# Patient Record
Sex: FEMALE | URBAN - METROPOLITAN AREA
[De-identification: names, ages, dates, MRNs, and addresses within clinical notes are randomized per-mention and may not be internally consistent; named-entity substitution may affect disease eponyms.]

---

## 2023-07-24 ENCOUNTER — LAB OUTSIDE AN ENCOUNTER (OUTPATIENT)
Dept: URBAN - METROPOLITAN AREA CLINIC 72 | Facility: CLINIC | Age: 23
End: 2023-07-24

## 2023-07-24 ENCOUNTER — OFFICE VISIT (OUTPATIENT)
Dept: URBAN - METROPOLITAN AREA CLINIC 72 | Facility: CLINIC | Age: 23
End: 2023-07-24
Payer: COMMERCIAL

## 2023-07-24 VITALS
HEIGHT: 68 IN | HEART RATE: 118 BPM | BODY MASS INDEX: 30.43 KG/M2 | DIASTOLIC BLOOD PRESSURE: 84 MMHG | TEMPERATURE: 97.5 F | SYSTOLIC BLOOD PRESSURE: 135 MMHG | WEIGHT: 200.8 LBS

## 2023-07-24 DIAGNOSIS — R10.30 LOWER ABDOMINAL PAIN: ICD-10-CM

## 2023-07-24 DIAGNOSIS — K21.00 GASTROESOPHAGEAL REFLUX DISEASE WITH ESOPHAGITIS WITHOUT HEMORRHAGE: ICD-10-CM

## 2023-07-24 DIAGNOSIS — R19.8 ALTERED BOWEL FUNCTION: ICD-10-CM

## 2023-07-24 PROBLEM — 266433003: Status: ACTIVE | Noted: 2023-07-24

## 2023-07-24 PROCEDURE — 99204 OFFICE O/P NEW MOD 45 MIN: CPT | Performed by: INTERNAL MEDICINE

## 2023-07-24 RX ORDER — DICYCLOMINE HYDROCHLORIDE 10 MG/1
1 CAPSULE CAPSULE ORAL THREE TIMES A DAY
Qty: 90 CAPSULE | Refills: 1 | OUTPATIENT
Start: 2023-07-24 | End: 2023-09-22

## 2023-07-24 RX ORDER — ATOGEPANT 60 MG/1
TAKE ONE TABLET BY MOUTH ONE TIME DAILY TABLET ORAL
Qty: 30 UNSPECIFIED | Refills: 2 | Status: ACTIVE | COMMUNITY

## 2023-07-24 RX ORDER — DULOXETINE HYDROCHLORIDE 60 MG/1
CAPSULE, DELAYED RELEASE ORAL
Qty: 90 CAPSULE | Status: ACTIVE | COMMUNITY

## 2023-07-24 RX ORDER — ELAGOLIX 150 MG/1
TABLET, FILM COATED ORAL
Qty: 84 TABLET | Status: ACTIVE | COMMUNITY

## 2023-07-24 RX ORDER — OMEPRAZOLE 40 MG/1
1 CAPSULE 30 MINUTES BEFORE MORNING MEAL CAPSULE, DELAYED RELEASE ORAL ONCE A DAY
Qty: 30 | Refills: 3 | OUTPATIENT
Start: 2023-07-24

## 2023-07-24 NOTE — HPI-TODAY'S VISIT:
22-year-old female new to the clinic referred by Dr. Barboza for diarrhea.   A copy of this note will be sent to the referring provider.  Past medical history of endometriosis had [place] IUD placed 11/2022.  Laparoscopy was discussed.  Referred for pelvic floor dysfunction to physical therapy.  On interview today she reports intermittent diarrhea and constipation for years.  Occur equally. No melena or hematochezia.  Has lower abdominal pain 4 days a week.  Unchanged with BM or food.  On OTC prilose daily for GERD for 6 months with breakthrough reflux.  Takes NSAIDs on occasional for her abdominal pain.    No CP, SOB, palpitations, syncope, near-syncope or problems with anesthesia previously.  LMP:  IUD  No prior EGD or colonoscopy  Had labs within the past year with PCP but she doesn't think her thyroid levels have been checked in 3 years .

## 2023-10-06 ENCOUNTER — LAB OUTSIDE AN ENCOUNTER (OUTPATIENT)
Dept: URBAN - METROPOLITAN AREA CLINIC 72 | Facility: CLINIC | Age: 23
End: 2023-10-06

## 2023-10-07 LAB
T4, FREE: 0.9
TSH: 1.14

## 2023-10-13 ENCOUNTER — TELEPHONE ENCOUNTER (OUTPATIENT)
Dept: URBAN - METROPOLITAN AREA CLINIC 72 | Facility: CLINIC | Age: 23
End: 2023-10-13

## 2023-10-16 ENCOUNTER — OFFICE VISIT (OUTPATIENT)
Dept: URBAN - METROPOLITAN AREA MEDICAL CENTER 40 | Facility: MEDICAL CENTER | Age: 23
End: 2023-10-16

## 2023-11-02 ENCOUNTER — OFFICE VISIT (OUTPATIENT)
Dept: URBAN - METROPOLITAN AREA MEDICAL CENTER 40 | Facility: MEDICAL CENTER | Age: 23
End: 2023-11-02

## 2023-11-08 ENCOUNTER — DASHBOARD ENCOUNTERS (OUTPATIENT)
Age: 23
End: 2023-11-08

## 2023-11-08 ENCOUNTER — OFFICE VISIT (OUTPATIENT)
Dept: URBAN - METROPOLITAN AREA CLINIC 72 | Facility: CLINIC | Age: 23
End: 2023-11-08
Payer: COMMERCIAL

## 2023-11-08 VITALS
BODY MASS INDEX: 30.92 KG/M2 | TEMPERATURE: 97.3 F | HEART RATE: 102 BPM | HEIGHT: 68 IN | DIASTOLIC BLOOD PRESSURE: 83 MMHG | SYSTOLIC BLOOD PRESSURE: 125 MMHG | WEIGHT: 204 LBS

## 2023-11-08 DIAGNOSIS — R10.30 LOWER ABDOMINAL PAIN: ICD-10-CM

## 2023-11-08 DIAGNOSIS — R19.8 ALTERED BOWEL FUNCTION: ICD-10-CM

## 2023-11-08 DIAGNOSIS — K21.00 GASTROESOPHAGEAL REFLUX DISEASE WITH ESOPHAGITIS WITHOUT HEMORRHAGE: ICD-10-CM

## 2023-11-08 PROCEDURE — 99214 OFFICE O/P EST MOD 30 MIN: CPT | Performed by: INTERNAL MEDICINE

## 2023-11-08 RX ORDER — DULOXETINE HYDROCHLORIDE 60 MG/1
CAPSULE, DELAYED RELEASE ORAL
Qty: 90 CAPSULE | Status: ACTIVE | COMMUNITY

## 2023-11-08 RX ORDER — ATOGEPANT 60 MG/1
TAKE ONE TABLET BY MOUTH ONE TIME DAILY TABLET ORAL
Qty: 30 UNSPECIFIED | Refills: 2 | Status: ACTIVE | COMMUNITY

## 2023-11-08 RX ORDER — ELAGOLIX 150 MG/1
TABLET, FILM COATED ORAL
Qty: 84 TABLET | Status: ACTIVE | COMMUNITY

## 2023-11-08 RX ORDER — OMEPRAZOLE 40 MG/1
1 CAPSULE 30 MINUTES BEFORE MORNING MEAL CAPSULE, DELAYED RELEASE ORAL ONCE A DAY
Qty: 30 | Refills: 3 | Status: ACTIVE | COMMUNITY
Start: 2023-07-24

## 2023-11-08 NOTE — HPI-TODAY'S VISIT:
Jessica returns for follow-up.  Recall she is a 23-year-old female last seen in office on 7/24/2023.  She was referred to us for evaluation for diarrhea.  Reported intermittent diarrhea and constipation for many years no blood in stool.  There was occasional lower abdominal discomfort.  She would take NSAIDs occasionally for abdominal discomfort.  Was on Prilosec for GERD symptoms with breakthrough reflux.  She did have an IUD in place.  She does have a history of endometriosis.  Based on her symptoms an upper endoscopy and colonoscopy were recommended.  She was given dicyclomine to try.  Thyroid studies were ordered and normal.  She is still having issues with reflux and alternating bowel pattern, when she takes the omeprazole her reflux is actually under good control and has no breakthrough symptoms.  She still having loose stools and constipation and abdominal discomfort ranging back and forth, has made some dietary changes with some improvement overall.  She is going to see the [facility] due to her history of Erler's Danlos syndrome at the end of November.

## 2023-11-08 NOTE — EXAM-GENERAL EXAMINATION
General--no acute distress, resting comfortably Eyes--anicteric, no pallor HENT--normocephalic, atraumatic head Neck--no lymphadenopathy, non tender Chest--non labored breathing, equal rise Abdomen--soft, non tender, non distended, no organomegaly Ext: HEATH, no obvious sores or rashes Psych: appropriate mood and affect Neuro--alert and oriented, answers appropriately